# Patient Record
Sex: MALE | Race: WHITE
[De-identification: names, ages, dates, MRNs, and addresses within clinical notes are randomized per-mention and may not be internally consistent; named-entity substitution may affect disease eponyms.]

---

## 2021-03-02 ENCOUNTER — HOSPITAL ENCOUNTER (OUTPATIENT)
Dept: HOSPITAL 53 - M ADAMS | Age: 61
End: 2021-03-02
Attending: INTERNAL MEDICINE
Payer: COMMERCIAL

## 2021-03-02 DIAGNOSIS — M25.522: Primary | ICD-10-CM

## 2021-03-02 NOTE — REP
INDICATION:

PAIN IN LEFT ELBOW



COMPARISON:

None.



TECHNIQUE:

AP, lateral, bilateral oblique views of the left elbow.



FINDINGS:

No acute fracture or dislocation is appreciated.  Joint spaces and surrounding soft

tissues appear normal. Lateral view demonstrates normal positioning to the anterior

and posterior fat pads without evidence for effusion/hemarthrosis.  No subcutaneous

emphysema or foreign body identified.



IMPRESSION:

Normal elbow radiographs.





<Electronically signed by Chung Patricia > 03/02/21 [id number]